# Patient Record
Sex: MALE | Race: WHITE | NOT HISPANIC OR LATINO | URBAN - METROPOLITAN AREA
[De-identification: names, ages, dates, MRNs, and addresses within clinical notes are randomized per-mention and may not be internally consistent; named-entity substitution may affect disease eponyms.]

---

## 2021-09-10 ENCOUNTER — EMERGENCY (EMERGENCY)
Facility: HOSPITAL | Age: 31
LOS: 1 days | Discharge: HOME | End: 2021-09-10
Attending: STUDENT IN AN ORGANIZED HEALTH CARE EDUCATION/TRAINING PROGRAM | Admitting: STUDENT IN AN ORGANIZED HEALTH CARE EDUCATION/TRAINING PROGRAM
Payer: COMMERCIAL

## 2021-09-10 VITALS
RESPIRATION RATE: 17 BRPM | SYSTOLIC BLOOD PRESSURE: 144 MMHG | TEMPERATURE: 98 F | HEART RATE: 82 BPM | OXYGEN SATURATION: 100 % | DIASTOLIC BLOOD PRESSURE: 99 MMHG

## 2021-09-10 LAB
ALBUMIN SERPL ELPH-MCNC: 5.1 G/DL — HIGH (ref 3.3–5)
ALP SERPL-CCNC: 69 U/L — SIGNIFICANT CHANGE UP (ref 40–120)
ALT FLD-CCNC: 49 U/L — HIGH (ref 4–41)
ANION GAP SERPL CALC-SCNC: 10 MMOL/L — SIGNIFICANT CHANGE UP (ref 7–14)
AST SERPL-CCNC: 24 U/L — SIGNIFICANT CHANGE UP (ref 4–40)
BASOPHILS # BLD AUTO: 0.04 K/UL — SIGNIFICANT CHANGE UP (ref 0–0.2)
BASOPHILS NFR BLD AUTO: 0.5 % — SIGNIFICANT CHANGE UP (ref 0–2)
BILIRUB SERPL-MCNC: 0.5 MG/DL — SIGNIFICANT CHANGE UP (ref 0.2–1.2)
BUN SERPL-MCNC: 16 MG/DL — SIGNIFICANT CHANGE UP (ref 7–23)
CALCIUM SERPL-MCNC: 9.5 MG/DL — SIGNIFICANT CHANGE UP (ref 8.4–10.5)
CHLORIDE SERPL-SCNC: 101 MMOL/L — SIGNIFICANT CHANGE UP (ref 98–107)
CO2 SERPL-SCNC: 27 MMOL/L — SIGNIFICANT CHANGE UP (ref 22–31)
CREAT SERPL-MCNC: 0.67 MG/DL — SIGNIFICANT CHANGE UP (ref 0.5–1.3)
EOSINOPHIL # BLD AUTO: 0.25 K/UL — SIGNIFICANT CHANGE UP (ref 0–0.5)
EOSINOPHIL NFR BLD AUTO: 3.3 % — SIGNIFICANT CHANGE UP (ref 0–6)
GLUCOSE SERPL-MCNC: 97 MG/DL — SIGNIFICANT CHANGE UP (ref 70–99)
HCT VFR BLD CALC: 46.1 % — SIGNIFICANT CHANGE UP (ref 39–50)
HGB BLD-MCNC: 15.7 G/DL — SIGNIFICANT CHANGE UP (ref 13–17)
IANC: 4.03 K/UL — SIGNIFICANT CHANGE UP (ref 1.5–8.5)
IMM GRANULOCYTES NFR BLD AUTO: 0.3 % — SIGNIFICANT CHANGE UP (ref 0–1.5)
LYMPHOCYTES # BLD AUTO: 2.38 K/UL — SIGNIFICANT CHANGE UP (ref 1–3.3)
LYMPHOCYTES # BLD AUTO: 31 % — SIGNIFICANT CHANGE UP (ref 13–44)
MAGNESIUM SERPL-MCNC: 2.3 MG/DL — SIGNIFICANT CHANGE UP (ref 1.6–2.6)
MCHC RBC-ENTMCNC: 30.6 PG — SIGNIFICANT CHANGE UP (ref 27–34)
MCHC RBC-ENTMCNC: 34.1 GM/DL — SIGNIFICANT CHANGE UP (ref 32–36)
MCV RBC AUTO: 89.9 FL — SIGNIFICANT CHANGE UP (ref 80–100)
MONOCYTES # BLD AUTO: 0.95 K/UL — HIGH (ref 0–0.9)
MONOCYTES NFR BLD AUTO: 12.4 % — SIGNIFICANT CHANGE UP (ref 2–14)
NEUTROPHILS # BLD AUTO: 4.03 K/UL — SIGNIFICANT CHANGE UP (ref 1.8–7.4)
NEUTROPHILS NFR BLD AUTO: 52.5 % — SIGNIFICANT CHANGE UP (ref 43–77)
NRBC # BLD: 0 /100 WBCS — SIGNIFICANT CHANGE UP
NRBC # FLD: 0 K/UL — SIGNIFICANT CHANGE UP
PHOSPHATE SERPL-MCNC: 3 MG/DL — SIGNIFICANT CHANGE UP (ref 2.5–4.5)
PLATELET # BLD AUTO: 298 K/UL — SIGNIFICANT CHANGE UP (ref 150–400)
POTASSIUM SERPL-MCNC: 4.1 MMOL/L — SIGNIFICANT CHANGE UP (ref 3.5–5.3)
POTASSIUM SERPL-SCNC: 4.1 MMOL/L — SIGNIFICANT CHANGE UP (ref 3.5–5.3)
PROT SERPL-MCNC: 7.9 G/DL — SIGNIFICANT CHANGE UP (ref 6–8.3)
RBC # BLD: 5.13 M/UL — SIGNIFICANT CHANGE UP (ref 4.2–5.8)
RBC # FLD: 12.7 % — SIGNIFICANT CHANGE UP (ref 10.3–14.5)
SODIUM SERPL-SCNC: 138 MMOL/L — SIGNIFICANT CHANGE UP (ref 135–145)
WBC # BLD: 7.67 K/UL — SIGNIFICANT CHANGE UP (ref 3.8–10.5)
WBC # FLD AUTO: 7.67 K/UL — SIGNIFICANT CHANGE UP (ref 3.8–10.5)

## 2021-09-10 PROCEDURE — 70450 CT HEAD/BRAIN W/O DYE: CPT | Mod: 26

## 2021-09-10 PROCEDURE — 99285 EMERGENCY DEPT VISIT HI MDM: CPT

## 2021-09-10 NOTE — ED PROVIDER NOTE - NSFOLLOWUPINSTRUCTIONS_ED_ALL_ED_FT
Please return to the ED for any concerns.     Please take an over the counter supplement for B12 and folate and follow-up with neurology Dr Schoenberg at [phone] in the next week or two.    GRADUALLY decrease your alcohol intake. Rapidly decreasing alcohol intake can be life threatening as it can result in seizures, coma or even death.

## 2021-09-10 NOTE — ED ADULT TRIAGE NOTE - CHIEF COMPLAINT QUOTE
pt c/o intermittent numbness and weakness over the past 2 days. currently denies symptoms. denies cp, sob

## 2021-09-10 NOTE — ED PROVIDER NOTE - PATIENT PORTAL LINK FT
You can access the FollowMyHealth Patient Portal offered by [facility] by registering at the following website: [email or web address]. By joining [facility]’s FollowMyHealth portal, you will also be able to view your health information using other applications (apps) compatible with our system.

## 2021-09-10 NOTE — ED PROVIDER NOTE - PROGRESS NOTE DETAILS
Attending MD Kendrick : Discussed with neuro, will eval patient for c/f Gu[place]ain [place]. Attending MD Kendrick : Seen by neuro. Doubt GBS. Patient admitted to neuro drinking 8 - 10 beers/day. ? folate deficiency. Didscussed with neuro atending, can follow-up in the next week / 2 weeks. Patient comfortable with plan.

## 2021-09-10 NOTE — ED PROVIDER NOTE - PHYSICAL EXAMINATION
Attending MD Kendrick :   PHYSICAL EXAM:    GENERAL: NAD  HEENT:  Atraumatic  CHEST/LUNG: Chest rise equal bilaterally  HEART: Regular rate and rhythm  ABDOMEN: Soft, Nontender, Nondistended  EXTREMITIES:  Extremities warm  PSYCH: A&Ox3  SKIN: No obvious rashes or lesions  MSK: No cervical spine TTP, able to range neck to the left and right/  NEUROLOGY: strength intact in all extremities. CN 2 - 12 intact. Finger to nose test intact. No pronator drift. Ambulatory without difficulty. Objectively diminished sensation to pain in distal upper extremities to pain. Able to feel light touch but states that it feels like there is a 'film'.

## 2021-09-10 NOTE — ED PROVIDER NOTE - OBJECTIVE STATEMENT
Attending MD Kendrick : 31 M no sig PMH p/w intermittent UE numbness x 2 days. Patient states that he was in his car when he suddenly felt his right arm go numb. This symptoms eventually improved, and then began to occur on his left arm the next day (today). That also somewhat resolved. He began feeling some tingling and numbness in his tongue, and that was when he got concerned and came in. Currently states that his numbness is occurring below the deltoid on the right arm. Feels some residual numbness on the left arm. No tongue numbness. States the sensation feels more numb more distal in his arms. No recent illness or diarrheal illness.     No hx drug abuse. Does not use whippits. No IV drugs. Only vapes and distant history of CBD use.

## 2021-09-10 NOTE — ED PROVIDER NOTE - CLINICAL SUMMARY MEDICAL DECISION MAKING FREE TEXT BOX
Attending MD Kendrick : 31 M p/w numbness to bilateral upper extremities with objective findings. Remainder of neuro exam normal. WIll obtain labs and CT Head. WIll reassess after w/u. Ddx includes electrolyte abnormality vs GBS vs intracranial lesion.

## 2021-09-11 NOTE — CONSULT NOTE ADULT - ASSESSMENT
Theo Ramirez is a 31 year old RH male with no reported past medical history who is presenting for numbness of the bilateral UE.    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 May also consider other metabolic sources of polyneuropathy. As patient with intact reflexes would not consider AIDP.    Recs:  [] outpatient MRI of the brain and cervical spine  [] may obtain copper, ceruloplasmin, Lyme screen, RPR, ESR, CRP, HIV, TSH, T3/T4, A1c, Vitamin B1, B6, B12, folate, Vit E, methylmalonic acid, homocysteine, RF, AIDA prior to discharge or may be done as outpatient  [] outpatient EMG/NCS  [] if patient's symptoms worsen or transition to weakness would advise return and imaging done  [] would advise tapering alcohol use and cessation of tobacco use    Case discussed with neurology attending, Dr. Schoenberg.

## 2021-09-11 NOTE — CONSULT NOTE ADULT - SUBJECTIVE AND OBJECTIVE BOX
HPI:  Theo Ramirez is a 31 year old RH male with no reported past medical history who is presenting for numbness of the bilateral UE. At baseline, the patient ambulates without any assistive devices and is oriented to person/place/time. The patient stated that over the last day he had developed UE numbness starting with the RUE which appeared and then disappeared and then developed LUE numbness and reoccurrence of the RUE numbness. Stated he also had some numbness of the tongue which has since disappeared. Noted that he also felt that he had some tingling of the toes of his RLE. Stated that numbness felt like "a glove on his hands". Numbness goes up to about the elbow bilaterally. Denied any weakness, dropping items. Denied any visual changes, auditory changes, dysphagia, dysarthria, headaches, neck pain, back pain, urinary frequency or incontinence, gait instability, vertigo, syncope, falls. Noted that he has never had symptoms like this in the past. Noted that if he does not pay attention to the symptoms he may not notice them but if he actively tries to touch his hands he notices that there is a difference in sensation.   Endorses a heavy amount of vaping and 8-12 beers per day. Denies any illicit drug use.     REVIEW OF SYSTEMS    A 10-system ROS was performed and is negative except for those items noted above and/or in the HPI.    PAST MEDICAL & SURGICAL HISTORY:  No pertinent past medical history    No significant past surgical history      FAMILY HISTORY:  No reported family history of neurological issues including strokes, seizures, multiple sclerosis, NMO.     SOCIAL HISTORY:   T/E/D:  heavy amount of vaping and 8-12 beers per day for several years  Occupation: [employer]   Lives with: family    MEDICATIONS (HOME):  Home Medications:  No home medications    ALLERGIES/INTOLERANCES:  PCN    Intolerances    VITALS & EXAMINATION:  Vital Signs Last 24 Hrs  T(C): 36.8 (10 Sep 2021 19:16), Max: 36.8 (10 Sep 2021 19:16)  T(F): 98.3 (10 Sep 2021 19:16), Max: 98.3 (10 Sep 2021 19:16)  HR: 82 (10 Sep 2021 19:16) (82 - 82)  BP: 144/99 (10 Sep 2021 19:16) (144/99 - 144/99)  BP(mean): --  RR: 17 (10 Sep 2021 19:16) (17 - 17)  SpO2: 100% (10 Sep 2021 19:16) (100% - 100%)    General:  Constitutional: Appears stated age, in no apparent distress including pain  Head: Normocephalic & atraumatic.    Neurological (>12):  MS: Awake, alert, oriented to person, place, situation, time. Normal affect. Follows all commands.    Language: Speech is clear, fluent with good comprehension.    CNs: PERRLA (R = 3mm, L = 3mm). VF intact in all 4 quadrants. 20/20 OS and OD. EOMI no nystagmus, no diplopia. No red desaturation. No APD. V1-3 intact to LT, well developed masseter muscles b/l. No facial asymmetry b/l, full eye closure strength b/l. Hearing grossly normal (rubbing fingers) b/l. Symmetric palate elevation in midline. Shoulder shrug intact b/l. Tongue midline, normal movements, no atrophy.    Motor: Normal muscle bulk & tone. No noticeable tremor or seizure. No pronator drift.              Deltoid	Biceps	Triceps	Wrist	Finger ABd	   R	5	5	5	5	5		5 	  L	5	5	5	5	5		5    	H-Flex	H-Ext	K-Flex	K-Ext	D-Flex	P-Flex  R	5	5	5	5	5	5		   L	5	5	5	5	5	5		     Sensation: Reduced sensation to LT and pinprick on the RUE up to the elbow. Reduced sensation to  LT and pinprick on the LUE to the mid forearm. Intact sensation to vibratory sense and position sense bilaterally on the UE. Intact sensation bilaterally on the bilateral LE.    Reflexes:              Biceps(C5)       BR(C6)     Triceps(C7)               Patellar(L4)    Achilles(S1)      R	2	          2	             2		        2		    2	  L	2	          2	             2		        2		    2		    Coordination: No dysmetria to FTN    Gait: No postural instability. Normal stance and tandem gait.     LABORATORY:  CBC                       15.7   7.67  )-----------( 298      ( 10 Sep 2021 21:57 )             46.1     Chem 09-10    138  |  101  |  16  ----------------------------<  97  4.1   |  27  |  0.67    Ca    9.5      10 Sep 2021 21:57  Phos  3.0     09-10  Mg     2.30     09-10    TPro  7.9  /  Alb  5.1<H>  /  TBili  0.5  /  DBili  x   /  AST  24  /  ALT  49<H>  /  AlkPhos  69  09-10    LFTs LIVER FUNCTIONS - ( 10 Sep 2021 21:57 )  Alb: 5.1 g/dL / Pro: 7.9 g/dL / ALK PHOS: 69 U/L / ALT: 49 U/L / AST: 24 U/L / GGT: x           STUDIES & IMAGING:    Radiology (XR, CT, MR, U/S, TTE/SONYA):    < from: CT Head No Cont (09.10.21 @ 22:43) >  FINDINGS:  There is no acute intracranial hemorrhage, vasogenic edema or evidence for acute large vascularterritory infarct.    The ventricles, sulci and cisternal spaces are unremarkable in size configuration the patient's stated age. There is no midline shift or abnormal extra-axial fluid collection.    The visualized paranasal sinuses and mastoid air cells are clear.  The calvarium is intact.    IMPRESSION:  No acute intra-cranial hemorrhage, vasogenic edema or extra-axial collection.    < end of copied text >